# Patient Record
Sex: MALE | Race: OTHER | URBAN - METROPOLITAN AREA
[De-identification: names, ages, dates, MRNs, and addresses within clinical notes are randomized per-mention and may not be internally consistent; named-entity substitution may affect disease eponyms.]

---

## 2021-06-24 ENCOUNTER — EMERGENCY (EMERGENCY)
Facility: HOSPITAL | Age: 56
LOS: 1 days | Discharge: HOME | End: 2021-06-24
Attending: EMERGENCY MEDICINE | Admitting: EMERGENCY MEDICINE
Payer: MEDICAID

## 2021-06-24 VITALS
SYSTOLIC BLOOD PRESSURE: 147 MMHG | RESPIRATION RATE: 18 BRPM | WEIGHT: 199.96 LBS | DIASTOLIC BLOOD PRESSURE: 82 MMHG | TEMPERATURE: 99 F | HEART RATE: 100 BPM | OXYGEN SATURATION: 95 %

## 2021-06-24 DIAGNOSIS — Z23 ENCOUNTER FOR IMMUNIZATION: ICD-10-CM

## 2021-06-24 DIAGNOSIS — I25.10 ATHEROSCLEROTIC HEART DISEASE OF NATIVE CORONARY ARTERY WITHOUT ANGINA PECTORIS: ICD-10-CM

## 2021-06-24 DIAGNOSIS — H57.89 OTHER SPECIFIED DISORDERS OF EYE AND ADNEXA: ICD-10-CM

## 2021-06-24 DIAGNOSIS — E11.9 TYPE 2 DIABETES MELLITUS WITHOUT COMPLICATIONS: ICD-10-CM

## 2021-06-24 DIAGNOSIS — H18.823 CORNEAL DISORDER DUE TO CONTACT LENS, BILATERAL: ICD-10-CM

## 2021-06-24 DIAGNOSIS — H40.9 UNSPECIFIED GLAUCOMA: ICD-10-CM

## 2021-06-24 DIAGNOSIS — I10 ESSENTIAL (PRIMARY) HYPERTENSION: ICD-10-CM

## 2021-06-24 DIAGNOSIS — F17.210 NICOTINE DEPENDENCE, CIGARETTES, UNCOMPLICATED: ICD-10-CM

## 2021-06-24 DIAGNOSIS — Z87.898 PERSONAL HISTORY OF OTHER SPECIFIED CONDITIONS: ICD-10-CM

## 2021-06-24 PROCEDURE — 99284 EMERGENCY DEPT VISIT MOD MDM: CPT

## 2021-06-24 RX ORDER — IBUPROFEN 200 MG
600 TABLET ORAL ONCE
Refills: 0 | Status: COMPLETED | OUTPATIENT
Start: 2021-06-24 | End: 2021-06-24

## 2021-06-24 RX ORDER — BACITRACIN 500 [USP'U]/G
1 OINTMENT OPHTHALMIC ONCE
Refills: 0 | Status: COMPLETED | OUTPATIENT
Start: 2021-06-24 | End: 2021-06-24

## 2021-06-24 RX ORDER — ACETAMINOPHEN 500 MG
975 TABLET ORAL ONCE
Refills: 0 | Status: COMPLETED | OUTPATIENT
Start: 2021-06-24 | End: 2021-06-24

## 2021-06-24 RX ORDER — OFLOXACIN 0.3 %
1 DROPS OPHTHALMIC (EYE) ONCE
Refills: 0 | Status: COMPLETED | OUTPATIENT
Start: 2021-06-24 | End: 2021-06-24

## 2021-06-24 RX ORDER — TETANUS TOXOID, REDUCED DIPHTHERIA TOXOID AND ACELLULAR PERTUSSIS VACCINE, ADSORBED 5; 2.5; 8; 8; 2.5 [IU]/.5ML; [IU]/.5ML; UG/.5ML; UG/.5ML; UG/.5ML
0.5 SUSPENSION INTRAMUSCULAR ONCE
Refills: 0 | Status: COMPLETED | OUTPATIENT
Start: 2021-06-24 | End: 2021-06-24

## 2021-06-24 RX ADMIN — BACITRACIN 1 APPLICATION(S): 500 OINTMENT OPHTHALMIC at 09:00

## 2021-06-24 RX ADMIN — Medication 1 DROP(S): at 09:02

## 2021-06-24 RX ADMIN — Medication 975 MILLIGRAM(S): at 09:00

## 2021-06-24 RX ADMIN — Medication 600 MILLIGRAM(S): at 09:02

## 2021-06-24 RX ADMIN — TETANUS TOXOID, REDUCED DIPHTHERIA TOXOID AND ACELLULAR PERTUSSIS VACCINE, ADSORBED 0.5 MILLILITER(S): 5; 2.5; 8; 8; 2.5 SUSPENSION INTRAMUSCULAR at 09:01

## 2021-06-24 NOTE — ED PROVIDER NOTE - PSYCHIATRIC, MLM
Alert and oriented to person, place, time/situation. Initially confrontational but improved with redirection.

## 2021-06-24 NOTE — ED PROVIDER NOTE - EYES, MLM
Bilateral scleral injection, 4mm pupils. Bilateral corneal abrasions on corneal exam. On the right eye abrasion around 6-7 O'clock and triangle shaped. On left eye, diffuse abrasion over about 60% of cornea covering pupil. Bilateral scleral injection, 4mm pupils. Bilateral corneal abrasions on corneal exam. On the right eye abrasion around 6-7 O'clock and triangle shaped. On left eye, diffuse abrasion over about 60% of cornea covering pupil. Visual acuity at baseline.

## 2021-06-24 NOTE — ED PROVIDER NOTE - PMH
CAD (coronary artery disease)    Cocaine abuse, in remission    Diabetes    Glaucoma    HTN (hypertension)    MI (myocardial infarction)

## 2021-06-24 NOTE — ED PROVIDER NOTE - PATIENT PORTAL LINK FT
You can access the FollowMyHealth Patient Portal offered by [facility] by registering at the following website: [email or web address]. By joining [facility]’s FollowMyHealth portal, you will also be able to view your health information using other applications (apps) compatible with our system.

## 2021-06-24 NOTE — ED PROVIDER NOTE - NSFOLLOWUPINSTRUCTIONS_ED_ALL_ED_FT
Please use the Ocuflox eye drops 1 drip 3 times per day for 7 days and the Bacitracin ointment 3 times per day for 7 days.    Please see you Eye doctor within 1-2 days [facility]).    If you vision gets worse see an eye doctors ASAP.

## 2021-06-24 NOTE — ED PROVIDER NOTE - OBJECTIVE STATEMENT
56 y/o male with PMHx of glaucoma presents to the ED complaining of bilateral eye burning an redness since last night. He apparently wore his single-use contact lenses for 48 hours and 2 hours after taking them out he began to have bilateral eye burning. He states his vision is at baseline and he has not put in new contact lenses. Patient says he has done this many times in the past without incident. He has had no other ocular exposure. He tried to rinse his eyes in the shower without success and the burning and redness persisted. He is not sure when his last Tetanus shot was. He is currently under the care of an ophthalmologist for Glaucoma.    He denies fever and chills. 56 y/o male with PMHx of glaucoma presents to the ED complaining of bilateral eye burning an redness since last night. He apparently wore his single-use contact lenses for 48 hours and 2 hours after taking them out he began to have bilateral eye burning. He states his vision is at baseline and he has not put in new contact lenses. Patient says he has done this many times in the past without incident. He has had no other ocular exposure. He tried to rinse his eyes in the shower without success and the burning and redness persisted. He is not sure when his last Tetanus shot was. He is currently under the care of an ophthalmologist for Glaucoma.  Patient endorses smoking cigarettes, and denies alcohol use. He denies fever and chills.

## 2021-09-23 NOTE — ED PROVIDER NOTE - CLINICAL SUMMARY MEDICAL DECISION MAKING FREE TEXT BOX
Patient presents with bilateral corneal abrasions, likely due to prolonged contact use. Patient counseled on contact lens use and he has been instructed not to wear them until he healed. Will give PO pain medication bacitracin and ocuflox and update tetanus. Patient instructed to follow up with eye doctor in 1-2 days.
(1) body pink, extremities blue